# Patient Record
Sex: FEMALE | Race: WHITE | Employment: FULL TIME | URBAN - METROPOLITAN AREA
[De-identification: names, ages, dates, MRNs, and addresses within clinical notes are randomized per-mention and may not be internally consistent; named-entity substitution may affect disease eponyms.]

---

## 2018-01-03 ENCOUNTER — HOSPITAL ENCOUNTER (OUTPATIENT)
Dept: MAMMOGRAPHY | Age: 43
Discharge: HOME | End: 2018-01-03
Attending: INTERNAL MEDICINE
Payer: COMMERCIAL

## 2018-01-03 DIAGNOSIS — Z12.39 SCREENING BREAST EXAMINATION: ICD-10-CM

## 2018-01-03 PROCEDURE — 77067 SCR MAMMO BI INCL CAD: CPT

## 2019-01-25 ENCOUNTER — HOSPITAL ENCOUNTER (OUTPATIENT)
Dept: MAMMOGRAPHY | Age: 44
Discharge: HOME | End: 2019-01-25
Attending: INTERNAL MEDICINE
Payer: COMMERCIAL

## 2019-01-25 DIAGNOSIS — Z12.39 SCREENING BREAST EXAMINATION: ICD-10-CM

## 2019-01-25 PROCEDURE — 77067 SCR MAMMO BI INCL CAD: CPT

## 2020-02-10 ENCOUNTER — HOSPITAL ENCOUNTER (OUTPATIENT)
Dept: MAMMOGRAPHY | Age: 45
Discharge: HOME | End: 2020-02-10
Attending: INTERNAL MEDICINE
Payer: COMMERCIAL

## 2020-02-10 DIAGNOSIS — Z12.31 VISIT FOR SCREENING MAMMOGRAM: ICD-10-CM

## 2020-02-10 PROCEDURE — 77067 SCR MAMMO BI INCL CAD: CPT

## 2021-02-22 ENCOUNTER — TRANSCRIBE ORDER (OUTPATIENT)
Dept: SCHEDULING | Age: 46
End: 2021-02-22

## 2021-02-22 DIAGNOSIS — Z12.31 VISIT FOR SCREENING MAMMOGRAM: Primary | ICD-10-CM

## 2021-04-12 ENCOUNTER — HOSPITAL ENCOUNTER (OUTPATIENT)
Dept: MAMMOGRAPHY | Age: 46
Discharge: HOME | End: 2021-04-12
Attending: INTERNAL MEDICINE
Payer: COMMERCIAL

## 2021-04-12 DIAGNOSIS — Z12.31 VISIT FOR SCREENING MAMMOGRAM: ICD-10-CM

## 2021-04-12 PROCEDURE — 77063 BREAST TOMOSYNTHESIS BI: CPT

## 2022-04-13 ENCOUNTER — TRANSCRIBE ORDER (OUTPATIENT)
Dept: SCHEDULING | Age: 47
End: 2022-04-13

## 2022-04-13 DIAGNOSIS — Z12.31 VISIT FOR SCREENING MAMMOGRAM: Primary | ICD-10-CM

## 2022-04-15 ENCOUNTER — HOSPITAL ENCOUNTER (OUTPATIENT)
Dept: MAMMOGRAPHY | Age: 47
Discharge: HOME | End: 2022-04-15
Attending: INTERNAL MEDICINE
Payer: COMMERCIAL

## 2022-04-15 DIAGNOSIS — Z12.31 VISIT FOR SCREENING MAMMOGRAM: ICD-10-CM

## 2022-04-15 PROCEDURE — 77063 BREAST TOMOSYNTHESIS BI: CPT

## 2022-07-07 ENCOUNTER — HOSPITAL ENCOUNTER (OUTPATIENT)
Dept: PREADMISSION TESTING | Age: 47
Discharge: HOME | End: 2022-07-07
Payer: COMMERCIAL

## 2022-07-07 VITALS
HEIGHT: 67 IN | HEART RATE: 77 BPM | SYSTOLIC BLOOD PRESSURE: 113 MMHG | TEMPERATURE: 98.3 F | RESPIRATION RATE: 16 BRPM | BODY MASS INDEX: 23.01 KG/M2 | DIASTOLIC BLOOD PRESSURE: 78 MMHG | WEIGHT: 146.61 LBS

## 2022-07-07 LAB — HCG SERPL QL: NEGATIVE

## 2022-07-07 PROCEDURE — 84703 CHORIONIC GONADOTROPIN ASSAY: CPT

## 2022-07-07 RX ORDER — DIAZEPAM 10 MG/1
10 TABLET ORAL
COMMUNITY

## 2022-07-07 RX ORDER — POLYETHYLENE GLYCOL 3350 17 G/17G
17 POWDER, FOR SOLUTION ORAL DAILY
COMMUNITY

## 2022-07-07 RX ORDER — NORETHINDRONE ACETATE AND ETHINYL ESTRADIOL 1; .02 MG/1; MG/1
1 TABLET ORAL DAILY
COMMUNITY

## 2022-07-07 RX ORDER — DOCUSATE SODIUM 100 MG/1
100 CAPSULE, LIQUID FILLED ORAL DAILY
COMMUNITY

## 2022-07-07 RX ORDER — BISMUTH SUBSALICYLATE 262 MG
1 TABLET,CHEWABLE ORAL DAILY
COMMUNITY

## 2022-07-07 NOTE — PERIOP NOTES
[address] INSTRUCTIONS    Surgery Date:   8/4/22    Your surgeon's office or [facility] staff will call you between 4 PM- 8 PM the day before surgery with your arrival time. If your surgery is on a Monday, you will receive a call the preceding Friday. 1. Please report to [facility] Patient Access/Admitting on the 1st floor. Bring your insurance card, photo identification, and any copayment ( if applicable). 2. If you are going home the same day of your surgery, you must have a responsible adult to drive you home. You need to have a responsible adult to stay with you the first 24 hours after surgery and you should not drive a car for 24 hours following your surgery. 3. Do NOT eat any solid foods after midnight the night before surgery including candy, mint or gum. You may drink clear liquids from midnight until 1 hour prior to your arrival. You may drink up to 12 ounces at one time every 4 hours. Please note special instructions, if applicable, below for medications. 4. Do NOT drink alcohol or smoke 24 hours before surgery. STOP smoking for 14 days prior as it helps with breathing and healing after surgery. 5. If you are being admitted to the hospital, please leave personal belongings/luggage in your car until you have an assigned hospital room number. 6. Please wear comfortable clothes. Wear your glasses instead of contacts. We ask that all money, jewelry and valuables be left at home. Wear no make up, particularly mascara, the day of surgery. 7.  All body piercings, rings, and jewelry need to be removed and left at home. Please remove any nail polish or artifical nails from your fingernails. Please wear your hair loose or down. Please no pony-tails, buns, or any metal hair accessories. If you shower the morning of surgery, please do not apply any lotions or powders afterwards. You may wear deodorant, unless having breast surgery.   Do not shave any body area within 24 hours of your surgery. 8. Please follow all instructions to avoid any potential surgical cancellation. 9. Should your physical condition change, (i.e. fever, cold, flu, etc.) please notify your surgeon as soon as possible. 10. It is important to be on time. If a situation occurs where you may be delayed, please call:  [phone] / [phone] on the day of surgery. 11. The Preadmission Testing staff can be reached at [phone]. 12. Special instructions: BRING CRUTCHES FOR FITTING, BRING ADVANCED DIRECTIVE      Current Outpatient Medications   Medication Sig    multivitamin (ONE A DAY) tablet Take 1 Tablet by mouth daily.  polyethylene glycol (Miralax) 17 gram packet Take 17 g by mouth daily.  docusate sodium (Colace) 100 mg capsule Take 100 mg by mouth daily.  OTHER 1 Tablet daily. AIRBORN    inulin (FIBER GUMMIES PO) Take 2 Tablets by mouth daily.  diazePAM (VALIUM) 10 mg tablet Take 10 mg by mouth every eight (8) hours as needed for Anxiety (RECTAL SPASMS).  naproxen (NAPROSYN) 500 mg tablet Take 1 Tab by mouth every twelve (12) hours as needed for Pain.  levonorgestrel (MIRENA) 20 mcg/24 hr IUD 1 Each by IntraUTERine route once. Placed 2009     PSYLLIUM SEED, WITH SUGAR, (METAMUCIL PO) Take  by mouth. And fiber vitamins, approx 8 gm fiber/day      No current facility-administered medications for this encounter. 1. YOU MUST ONLY TAKE THESE MEDICATIONS THE MORNING OF SURGERY WITH A SIP OF WATER: NONE  2. MEDICATIONS TO TAKE THE MORNING OF SURGERY ONLY IF NEEDED: DIAZEPAM 4 HRS PRIOR TO ARRIVAL TIME  3. HOLD these prescription medications BEFORE Surgery: NONE  4. Ask your surgeon/prescribing physician about when/if to STOP taking these medications:BCP Stop all vitamins, herbal medicines and Aspirin containing products 7 days prior to surgery. Stop any non-steroidal anti-inflammatory drugs (i.e. Ibuprofen, Naproxen, Advil, Aleve) 3 days before surgery. You may take Tylenol. 5. If you are currently taking Plavix, Coumadin,or any other blood-thinning/anticoagulant medication contact your prescribing physician for instructions. Eating and Drinking Before Surgery     You may eat a regular dinner at the usual time on the day before your surgery.  Do NOT eat any solid foods after midnight unless your arrival time at the hospital is 3pm or later.  You may drink clear liquids only from 12 midnight until 1 hours prior to your arrival time at the hospital on the day of your surgery. Do NOT drink alcohol.  Clear liquids include:  o Water  o Fruit juices without pulp( i.e. apple juice)  o Carbonated beverages  o Black coffee (no cream/milk)  o Tea (no cream/milk)  o Gatorade   You may drink up to 12-16 ounces at one time every 4 hours between the hours of midnight and 1 hour before your arrival time at the hospital. Example- if your arrival time at the hospital is 6am, you may drink 12-16 ounces of clear liquids no later than 5am.   If your arrival time at the hospital is 3pm or later, you may eat a light breakfast before 8am.   A light breakfast includes:  o Toast or bagel (no butter)  o Black coffee (no cream/milk)  o Tea (no cream/milk)  o Fruit juices without pulp ( i.e. apple juice)  o Do NOT eat meat, eggs, vegetables or fruit   If you have any questions, please contact your surgeon's office. Preventing Infections Before and After - Your Surgery  1. Shower with ANTIBACTERIAL soap 2 times before your surgery   The evening before your surgery   The morning of your surgery      Tips to help prevent infections after your surgery:  1. Protect your surgical wound from germs:  ? Hand washing is the most important thing you and your caregivers can do to prevent infections. ? Keep your bandage clean and dry! ? Do not touch your surgical wound. 2. Use clean, freshly washed towels and washcloths every time you shower; do not share bath linens with others.   3. Until your surgical wound is healed, wear clothing and sleep on bed linens each day that are clean and freshly washed. 4. Do not allow pets to sleep in your bed with you or touch your surgical wound. 5. Do not smoke - smoking delays wound healing. This may be a good time to stop smoking. 6. If you have diabetes, it is important for you to manage your blood sugar levels properly before your surgery as well as after your surgery. Poorly managed blood sugar levels slow down wound healing and prevent you from healing completely. Patient Information Regarding COVID Restrictions      Day of Procedure     Please park in the parking deck or any designated visitor parking lot.  Enter the facility through the [facility] the [facility].   On the day of surgery, please provide the cell phone number of the person who will be waiting for you to the Patient Access representative at the time of registration.  Please wear a mask on the day of your procedure.  We are now allowing two designated visitors per stay. Pediatric patients may have 2 designated visitors. These two people may come in with you on the day of your procedure.  The designated visitor must also wear a mask.  Once your procedure and the immediate recovery period is completed, a nurse in the recovery area will contact your designated visitor to inform them of your room number or to otherwise review other pertinent information regarding your care.  Social distancing practices are to be adhered to in waiting areas and the cafeteria. The patient was contacted  in person. She verbalized understanding of all instructions does not  need reinforcement.

## 2022-08-03 ENCOUNTER — ANESTHESIA EVENT (OUTPATIENT)
Dept: MEDSURG UNIT | Age: 47
End: 2022-08-03
Payer: COMMERCIAL

## 2022-08-03 NOTE — H&P
History and Physical    Chief Complaint: Pain of the Right Knee    HISTORY OF PRESENT OF ILLNESS:  Maribel Venegas is a 41 y.o. female who returns today for follow-up of the right knee. Patient has a current ACL tear sustained when she fell down a hill. Patient has been attending physical therapy with good improvement. She reports one moment of instability when she was changing direction while walking; she describes this as a slipping shift in the knee. She has a colorectal surgery scheduled for 5/2022; her surgeon has cleared her for the ACL reconstruction should she desire to proceed. Patient is ambulating without assistance today. Past Medical History:   Diagnosis Date    Autoimmune disease    Graves disease    PONV (postoperative nausea and vomiting)     Past Surgical History:   Procedure Laterality Date    APPENDECTOMY    FOOT SURGERY Left   cyst removal    HAND SURGERY Left   middle finger joint replacement as child    KNEE SCOPE,REMV LOOSE BODY Right 09/09/2020   Procedure: ARTHROSCOPY, KNEE, SURGICAL; FOR REMOVAL OF LOOSE BODY OR FOREIGN BODY (EG, OSTEOCHONDRITIS DISSECANS FRAGMENTATION, CHONDRAL FRAGMENTATION); Surgeon: Anika Cortes MD; Location: [facility] [place]; Service: Orthopedics    KNEE 3 [facility] Right 09/09/2020   Procedure: ARTHROSCOPY, KNEE, SURGICAL; DEBRIDEMENT/SHAVING OF ARTICULAR CARTILAGE (CHONDROPLASTY); Surgeon: Anika Cortes MD; Location: [facility] [place];  Service: Orthopedics    KNEE SURGERY Left   ACL    NO RELEVANT SURGERIES    WISDOM TOOTH EXTRACTION     Family History   Problem Relation Age of Onset    Deep vein thrombosis Mother    Cancer Father    Coronary artery disease Father    No Known Problems Brother    No Known Problems Sister    No Known Problems Son    No Known Problems Daughter    Diabetes Neg Hx    Clotting disorder Neg Hx    Anesthesia problems Neg Hx     [unfilled]  Review of Systems   5/12/2022    Constitutional: Unexplained: Negative  Genitourinary: Frequent Urination: Negative  HEENT: Vision Loss: Negative  Neurological: Memory Loss: Negative  Integumentary: Rash: Negative  Cardiovascular: Palpatations: Negative  Hematologic: Bruises/Bleeds Easily: Negative  Gastrointestinal: Constipation: Negative  Immunological: Seasonal Allergies: Negative  Musculoskeletal: Joint Pain: Positive  Objective:     Vitals:   05/12/22 1342   Weight: 155 lb   Height: 5' 7\"     Constitutional: No acute distress. Well nourished. Well developed. Psychiatric: Alert and oriented x3. Eyes: Sclera are nonicteric. Respiratory: No labored breathing. Cardiovascular: No marked edema. Skin: No marked skin ulcers. Neurological: No marked sensory loss noted. Right knee exam has full range of motion. Less translation on Lachman with better end point. Normal quad strength. No effusion. The leg is neurovascular intact distally with no skin changes rashes erythema deformity or bruising about the leg. There is no edema and good pulses distally. Radiographs:         No imaging obtained     Assessment:     1. Rupture of anterior cruciate ligament of right knee, initial encounter     There is no problem list on file for this patient. Plan:     She set up for her colorectal surgery at the end of May with her Anterior Cruciate Ligament surgery set for the 4th of August. She wants use allograft and so will set her up for a quad tendon allograft. We will try to do her at the operatory but she probably will end up at [facility] because of the need for allograft. of a She is around 2 months out from ACL tear with good improvement in physical therapy. We are preparing for surgery at this point which she is scheduled for on 8/4/2022; she comes in today for a motion check and to discuss the operation further. She has had one moment of instability with daily activity so I am assured that surgery is the best next step as this it may contribute to wearing of the meniscus.  She had previous cadaver graft on the left side so would like to proceed with this on the right side. All of her questions were answered today. Follow-up after the operation. We discussed the risks of surgical treatment of Anterior Cruciate Ligament injury as well as the risks of nonsurgical treatment. We talked about the likelihood of arthritis over time in the knee with a deficient Anterior Cruciate Ligament. Talked about the inability to play cutting sports without an Anterior Cruciate Ligament. We discussed this with them the likelihood that with Anterior Cruciate Ligament surgery the patient would return to or near to their previous level of athletic activity. Talked about the procedure of the case including the arthroscopic assisted nature of the procedure and the minimal invasive technique. Talked about graft options and discussed with them hamstrings and patellar tendon. We discussed patellar tendon was the goal standard for young athletes. We discussed that hamstring was an excellent options in certain patients. We discussed the benefits of patellar tendon being the bony ingrowth of the graft and confidence that it would heal in 6 weeks. We discussed the down side being the anterior knee numbness and the difficulty with kneeling and potentially anterior knee pain. With hamstrings we discussed the affect on the acceleration and the chance that it would affect hamstring strength. We also talked about soft tissue healing to bone. We then went through the entirety of the rehab including what would happen with and without a meniscus repair. We talked about how they would, if it was just a Anterior Cruciate Ligament injury, be able to weightbear immediately and to wean out of the brace and crutches as soon as possible. We talked about the fact that meniscal injury would require more limited approach initially.  Talked about a goal of having them jog at around 3 months post surgically and return to cutting sports at 6 months postsurgically assuming they had their normal milestones in their quad strength returned. The pt expressed understanding of all this discussion. We answered all of the pt's questions. No orders of the defined types were placed in this encounter. Return in about 3 months (around 8/12/2022) for Follow Up. Medical documentation was entered into the chart by me, Steve Nails, medical scribe for Dr. Ted Fairchild.   5/12/2022     I, Giovanni Calabrese MD, personally, performed the services described in this documentation, as scribed in my presence, and it is both accurate and complete.   Scribed by: Steve Nails  Electronically signed by Giovanni Calabrese MD at 05/12/2022 8:10 PM EDT   Plan of Treatment  - documented as of this encounter    Plan of Treatment - Upcoming Encounters  Upcoming Encounters  Date Type Specialty Care Team Description   08/04/2022 Surgical Encounter Orthopaedic Surgery Lulu Murcia MD    [address] [address]    [place] [address]    [phone] (Work)    [phone] ([facility] [address])       [place], [address]    [place], [facility] [address]    [phone] (Work)    [phone] (Fax)       08/15/2022 Office Visit Orthopaedic Surgery Saúl Carlos, [address] [place] [address] [address]    [place] [address]    [phone] (Work)    [phone] (Fax)         Visit Diagnoses  - documented in this encounter    Visit Diagnoses  Diagnosis   Rupture of anterior cruciate ligament of right knee, initial encounter - Primary       Care Teams  - documented as of this encounter    Care Teams  Team Member Relationship Specialty Start Date End Date   Candace Sujit, [address] [facility], [address]    [phone] (Work)    [phone] (Fax)

## 2022-08-04 ENCOUNTER — HOSPITAL ENCOUNTER (OUTPATIENT)
Age: 47
Setting detail: OUTPATIENT SURGERY
Discharge: HOME | End: 2022-08-04
Attending: ORTHOPAEDIC SURGERY | Admitting: ORTHOPAEDIC SURGERY
Payer: COMMERCIAL

## 2022-08-04 ENCOUNTER — ANESTHESIA (OUTPATIENT)
Dept: MEDSURG UNIT | Age: 47
End: 2022-08-04
Payer: COMMERCIAL

## 2022-08-04 VITALS
DIASTOLIC BLOOD PRESSURE: 83 MMHG | HEART RATE: 74 BPM | RESPIRATION RATE: 12 BRPM | SYSTOLIC BLOOD PRESSURE: 122 MMHG | TEMPERATURE: 97.1 F | WEIGHT: 146.61 LBS | OXYGEN SATURATION: 97 % | BODY MASS INDEX: 22.96 KG/M2

## 2022-08-04 DIAGNOSIS — S83.511A NEW ACL TEAR, RIGHT, INITIAL ENCOUNTER: Primary | ICD-10-CM

## 2022-08-04 LAB — HCG UR QL: NEGATIVE

## 2022-08-04 PROCEDURE — 77030040922 HC BLNKT HYPOTHRM STRY -A

## 2022-08-04 PROCEDURE — 76210000035 HC AMBSU PH I REC 1 TO 1.5 HR: Performed by: ORTHOPAEDIC SURGERY

## 2022-08-04 PROCEDURE — 81025 URINE PREGNANCY TEST: CPT

## 2022-08-04 PROCEDURE — 77030008753 HC TU IRR IN/OUT FLO S&N -B: Performed by: ORTHOPAEDIC SURGERY

## 2022-08-04 PROCEDURE — 77030040361 HC SLV COMPR DVT MDII -B: Performed by: ORTHOPAEDIC SURGERY

## 2022-08-04 PROCEDURE — 77030010509 HC AIRWY LMA MSK TELE -A: Performed by: ANESTHESIOLOGY

## 2022-08-04 PROCEDURE — 74011250636 HC RX REV CODE- 250/636: Performed by: PHYSICIAN ASSISTANT

## 2022-08-04 PROCEDURE — 2709999900 HC NON-CHARGEABLE SUPPLY: Performed by: ORTHOPAEDIC SURGERY

## 2022-08-04 PROCEDURE — 74011250637 HC RX REV CODE- 250/637: Performed by: ANESTHESIOLOGY

## 2022-08-04 PROCEDURE — C1713 ANCHOR/SCREW BN/BN,TIS/BN: HCPCS | Performed by: ORTHOPAEDIC SURGERY

## 2022-08-04 PROCEDURE — 77030020269 HC MISC IMPL: Performed by: ORTHOPAEDIC SURGERY

## 2022-08-04 PROCEDURE — 76060000063 HC AMB SURG ANES 1.5 TO 2 HR: Performed by: ORTHOPAEDIC SURGERY

## 2022-08-04 PROCEDURE — 76030000020 HC AMB SURG 1.5 TO 2 HR INTENSV-TIER 1: Performed by: ORTHOPAEDIC SURGERY

## 2022-08-04 PROCEDURE — 77030020275 HC MISC ORTHOPEDIC: Performed by: ORTHOPAEDIC SURGERY

## 2022-08-04 PROCEDURE — 77030003601 HC NDL NRV BLK BBMI -A

## 2022-08-04 PROCEDURE — 74011250636 HC RX REV CODE- 250/636: Performed by: NURSE ANESTHETIST, CERTIFIED REGISTERED

## 2022-08-04 PROCEDURE — 74011250636 HC RX REV CODE- 250/636: Performed by: ANESTHESIOLOGY

## 2022-08-04 PROCEDURE — 77030002933 HC SUT MCRYL J&J -A: Performed by: ORTHOPAEDIC SURGERY

## 2022-08-04 PROCEDURE — 74011250637 HC RX REV CODE- 250/637: Performed by: ORTHOPAEDIC SURGERY

## 2022-08-04 PROCEDURE — 77030029200 HC SUT PASS WRE ARTH -B: Performed by: ORTHOPAEDIC SURGERY

## 2022-08-04 PROCEDURE — 77030020753 HC CUF TRNQT 1BLA STRY -B: Performed by: ORTHOPAEDIC SURGERY

## 2022-08-04 PROCEDURE — 77030018834: Performed by: ORTHOPAEDIC SURGERY

## 2022-08-04 PROCEDURE — 74011000250 HC RX REV CODE- 250: Performed by: NURSE ANESTHETIST, CERTIFIED REGISTERED

## 2022-08-04 PROCEDURE — 74011000250 HC RX REV CODE- 250: Performed by: PHYSICIAN ASSISTANT

## 2022-08-04 PROCEDURE — 77030031139 HC SUT VCRL2 J&J -A: Performed by: ORTHOPAEDIC SURGERY

## 2022-08-04 PROCEDURE — 77030006884 HC BLD SHV INCIS S&N -B: Performed by: ORTHOPAEDIC SURGERY

## 2022-08-04 RX ORDER — MIDAZOLAM HYDROCHLORIDE 1 MG/ML
1 INJECTION, SOLUTION INTRAMUSCULAR; INTRAVENOUS AS NEEDED
Status: DISCONTINUED | OUTPATIENT
Start: 2022-08-04 | End: 2022-08-04 | Stop reason: HOSPADM

## 2022-08-04 RX ORDER — MORPHINE SULFATE 2 MG/ML
2 INJECTION, SOLUTION INTRAMUSCULAR; INTRAVENOUS
Status: DISCONTINUED | OUTPATIENT
Start: 2022-08-04 | End: 2022-08-04 | Stop reason: HOSPADM

## 2022-08-04 RX ORDER — LIDOCAINE HYDROCHLORIDE 20 MG/ML
INJECTION, SOLUTION EPIDURAL; INFILTRATION; INTRACAUDAL; PERINEURAL AS NEEDED
Status: DISCONTINUED | OUTPATIENT
Start: 2022-08-04 | End: 2022-08-04 | Stop reason: HOSPADM

## 2022-08-04 RX ORDER — HYDROMORPHONE HYDROCHLORIDE 1 MG/ML
0.5 INJECTION, SOLUTION INTRAMUSCULAR; INTRAVENOUS; SUBCUTANEOUS
Status: DISCONTINUED | OUTPATIENT
Start: 2022-08-04 | End: 2022-08-04 | Stop reason: HOSPADM

## 2022-08-04 RX ORDER — SODIUM CHLORIDE, SODIUM LACTATE, POTASSIUM CHLORIDE, CALCIUM CHLORIDE 600; 310; 30; 20 MG/100ML; MG/100ML; MG/100ML; MG/100ML
100 INJECTION, SOLUTION INTRAVENOUS CONTINUOUS
Status: DISCONTINUED | OUTPATIENT
Start: 2022-08-04 | End: 2022-08-04 | Stop reason: HOSPADM

## 2022-08-04 RX ORDER — SODIUM CHLORIDE 0.9 % (FLUSH) 0.9 %
5-40 SYRINGE (ML) INJECTION AS NEEDED
Status: DISCONTINUED | OUTPATIENT
Start: 2022-08-04 | End: 2022-08-04 | Stop reason: HOSPADM

## 2022-08-04 RX ORDER — ROPIVACAINE HYDROCHLORIDE 5 MG/ML
30 INJECTION, SOLUTION EPIDURAL; INFILTRATION; PERINEURAL AS NEEDED
Status: COMPLETED | OUTPATIENT
Start: 2022-08-04 | End: 2022-08-04

## 2022-08-04 RX ORDER — FENTANYL CITRATE 50 UG/ML
INJECTION, SOLUTION INTRAMUSCULAR; INTRAVENOUS AS NEEDED
Status: DISCONTINUED | OUTPATIENT
Start: 2022-08-04 | End: 2022-08-04 | Stop reason: HOSPADM

## 2022-08-04 RX ORDER — MIDAZOLAM HYDROCHLORIDE 1 MG/ML
0.5 INJECTION, SOLUTION INTRAMUSCULAR; INTRAVENOUS
Status: DISCONTINUED | OUTPATIENT
Start: 2022-08-04 | End: 2022-08-04 | Stop reason: HOSPADM

## 2022-08-04 RX ORDER — SODIUM CHLORIDE, SODIUM LACTATE, POTASSIUM CHLORIDE, CALCIUM CHLORIDE 600; 310; 30; 20 MG/100ML; MG/100ML; MG/100ML; MG/100ML
INJECTION, SOLUTION INTRAVENOUS
Status: DISCONTINUED | OUTPATIENT
Start: 2022-08-04 | End: 2022-08-04 | Stop reason: HOSPADM

## 2022-08-04 RX ORDER — ACETAMINOPHEN 325 MG/1
650 TABLET ORAL ONCE
Status: DISCONTINUED | OUTPATIENT
Start: 2022-08-04 | End: 2022-08-04 | Stop reason: HOSPADM

## 2022-08-04 RX ORDER — LIDOCAINE HYDROCHLORIDE 10 MG/ML
0.1 INJECTION, SOLUTION EPIDURAL; INFILTRATION; INTRACAUDAL; PERINEURAL AS NEEDED
Status: DISCONTINUED | OUTPATIENT
Start: 2022-08-04 | End: 2022-08-04 | Stop reason: HOSPADM

## 2022-08-04 RX ORDER — SCOLOPAMINE TRANSDERMAL SYSTEM 1 MG/1
1 PATCH, EXTENDED RELEASE TRANSDERMAL
Status: DISCONTINUED | OUTPATIENT
Start: 2022-08-04 | End: 2022-08-04 | Stop reason: HOSPADM

## 2022-08-04 RX ORDER — DIPHENHYDRAMINE HYDROCHLORIDE 50 MG/ML
12.5 INJECTION, SOLUTION INTRAMUSCULAR; INTRAVENOUS AS NEEDED
Status: DISCONTINUED | OUTPATIENT
Start: 2022-08-04 | End: 2022-08-04 | Stop reason: HOSPADM

## 2022-08-04 RX ORDER — PROPOFOL 10 MG/ML
INJECTION, EMULSION INTRAVENOUS AS NEEDED
Status: DISCONTINUED | OUTPATIENT
Start: 2022-08-04 | End: 2022-08-04 | Stop reason: HOSPADM

## 2022-08-04 RX ORDER — FENTANYL CITRATE 50 UG/ML
25 INJECTION, SOLUTION INTRAMUSCULAR; INTRAVENOUS
Status: DISCONTINUED | OUTPATIENT
Start: 2022-08-04 | End: 2022-08-04 | Stop reason: HOSPADM

## 2022-08-04 RX ORDER — SODIUM CHLORIDE 0.9 % (FLUSH) 0.9 %
5-40 SYRINGE (ML) INJECTION EVERY 8 HOURS
Status: DISCONTINUED | OUTPATIENT
Start: 2022-08-04 | End: 2022-08-04 | Stop reason: HOSPADM

## 2022-08-04 RX ORDER — OXYCODONE AND ACETAMINOPHEN 5; 325 MG/1; MG/1
1 TABLET ORAL
Qty: 10 TABLET | Refills: 0 | Status: SHIPPED | OUTPATIENT
Start: 2022-08-04 | End: 2022-08-11

## 2022-08-04 RX ORDER — DEXAMETHASONE SODIUM PHOSPHATE 4 MG/ML
INJECTION, SOLUTION INTRA-ARTICULAR; INTRALESIONAL; INTRAMUSCULAR; INTRAVENOUS; SOFT TISSUE AS NEEDED
Status: DISCONTINUED | OUTPATIENT
Start: 2022-08-04 | End: 2022-08-04 | Stop reason: HOSPADM

## 2022-08-04 RX ORDER — KETAMINE HCL IN 0.9 % NACL 50 MG/5 ML
SYRINGE (ML) INTRAVENOUS AS NEEDED
Status: DISCONTINUED | OUTPATIENT
Start: 2022-08-04 | End: 2022-08-04 | Stop reason: HOSPADM

## 2022-08-04 RX ORDER — FENTANYL CITRATE 50 UG/ML
50 INJECTION, SOLUTION INTRAMUSCULAR; INTRAVENOUS AS NEEDED
Status: DISCONTINUED | OUTPATIENT
Start: 2022-08-04 | End: 2022-08-04 | Stop reason: HOSPADM

## 2022-08-04 RX ORDER — BUPROPION HYDROCHLORIDE 150 MG/1
150 TABLET ORAL DAILY
COMMUNITY

## 2022-08-04 RX ORDER — ONDANSETRON 4 MG/1
4 TABLET, ORALLY DISINTEGRATING ORAL
Status: DISCONTINUED | OUTPATIENT
Start: 2022-08-04 | End: 2022-08-04 | Stop reason: HOSPADM

## 2022-08-04 RX ORDER — ONDANSETRON 2 MG/ML
4 INJECTION INTRAMUSCULAR; INTRAVENOUS AS NEEDED
Status: DISCONTINUED | OUTPATIENT
Start: 2022-08-04 | End: 2022-08-04

## 2022-08-04 RX ORDER — ONDANSETRON 2 MG/ML
INJECTION INTRAMUSCULAR; INTRAVENOUS AS NEEDED
Status: DISCONTINUED | OUTPATIENT
Start: 2022-08-04 | End: 2022-08-04 | Stop reason: HOSPADM

## 2022-08-04 RX ORDER — SODIUM CHLORIDE 9 MG/ML
25 INJECTION, SOLUTION INTRAVENOUS CONTINUOUS
Status: DISCONTINUED | OUTPATIENT
Start: 2022-08-04 | End: 2022-08-04 | Stop reason: HOSPADM

## 2022-08-04 RX ADMIN — ONDANSETRON HYDROCHLORIDE 4 MG: 2 INJECTION, SOLUTION INTRAMUSCULAR; INTRAVENOUS at 09:32

## 2022-08-04 RX ADMIN — FENTANYL CITRATE 50 MCG: 50 INJECTION, SOLUTION INTRAMUSCULAR; INTRAVENOUS at 08:57

## 2022-08-04 RX ADMIN — FENTANYL CITRATE 25 MCG: 50 INJECTION, SOLUTION INTRAMUSCULAR; INTRAVENOUS at 10:09

## 2022-08-04 RX ADMIN — ROPIVACAINE HYDROCHLORIDE 30 ML: 5 INJECTION, SOLUTION EPIDURAL; INFILTRATION; PERINEURAL at 07:38

## 2022-08-04 RX ADMIN — SODIUM CHLORIDE, POTASSIUM CHLORIDE, SODIUM LACTATE AND CALCIUM CHLORIDE: 600; 310; 30; 20 INJECTION, SOLUTION INTRAVENOUS at 07:59

## 2022-08-04 RX ADMIN — WATER 2 G: 1 INJECTION INTRAMUSCULAR; INTRAVENOUS; SUBCUTANEOUS at 08:16

## 2022-08-04 RX ADMIN — FENTANYL CITRATE 50 MCG: 50 INJECTION, SOLUTION INTRAMUSCULAR; INTRAVENOUS at 09:34

## 2022-08-04 RX ADMIN — PROPOFOL 150 MG: 10 INJECTION, EMULSION INTRAVENOUS at 08:05

## 2022-08-04 RX ADMIN — LIDOCAINE HYDROCHLORIDE 100 MG: 20 INJECTION, SOLUTION EPIDURAL; INFILTRATION; INTRACAUDAL; PERINEURAL at 08:05

## 2022-08-04 RX ADMIN — PROPOFOL 50 MG: 10 INJECTION, EMULSION INTRAVENOUS at 08:45

## 2022-08-04 RX ADMIN — SODIUM CHLORIDE, POTASSIUM CHLORIDE, SODIUM LACTATE AND CALCIUM CHLORIDE 100 ML/HR: 600; 310; 30; 20 INJECTION, SOLUTION INTRAVENOUS at 07:36

## 2022-08-04 RX ADMIN — PROPOFOL 50 MG: 10 INJECTION, EMULSION INTRAVENOUS at 08:15

## 2022-08-04 RX ADMIN — DEXAMETHASONE SODIUM PHOSPHATE 8 MG: 4 INJECTION, SOLUTION INTRAMUSCULAR; INTRAVENOUS at 08:19

## 2022-08-04 RX ADMIN — Medication 20 MG: at 08:05

## 2022-08-04 RX ADMIN — FENTANYL CITRATE 50 MCG: 50 INJECTION, SOLUTION INTRAMUSCULAR; INTRAVENOUS at 07:30

## 2022-08-04 RX ADMIN — ONDANSETRON 4 MG: 4 TABLET, ORALLY DISINTEGRATING ORAL at 11:01

## 2022-08-04 RX ADMIN — MIDAZOLAM 2 MG: 1 INJECTION INTRAMUSCULAR; INTRAVENOUS at 07:30

## 2022-08-04 NOTE — ANESTHESIA PREPROCEDURE EVALUATION
Relevant Problems   No relevant active problems       Anesthetic History   No history of anesthetic complications            Review of Systems / Medical History  Patient summary reviewed, nursing notes reviewed and pertinent labs reviewed    Pulmonary  Within defined limits                 Neuro/Psych   Within defined limits      Psychiatric history     Cardiovascular  Within defined limits                Exercise tolerance: >4 METS     GI/Hepatic/Renal  Within defined limits   GERD           Endo/Other  Within defined limits    Hypothyroidism       Other Findings              Physical Exam    Airway  Mallampati: II  TM Distance: > 6 cm  Neck ROM: normal range of motion   Mouth opening: Normal     Cardiovascular  Regular rate and rhythm,  S1 and S2 normal,  no murmur, click, rub, or gallop             Dental  No notable dental hx       Pulmonary  Breath sounds clear to auscultation               Abdominal  GI exam deferred       Other Findings            Anesthetic Plan    ASA: 2  Anesthesia type: general      Post-op pain plan if not by surgeon: peripheral nerve block single    Induction: Intravenous  Anesthetic plan and risks discussed with: Patient

## 2022-08-04 NOTE — PROGRESS NOTES
08/04/22 0747   Vitals   Temp 98.3 °F (36.8 °C)   Temp Source Oral   Pulse (Heart Rate) 78   Heart Rate Source Monitor   Resp Rate 16   O2 Sat (%) 99 %   Level of Consciousness Alert (0)   /84   MAP (Calculated) 100   BP 1 Location Left upper arm   BP 1 Method Automatic   BP Patient Position At rest   MEWS Score 1   Oxygen Therapy   O2 Device Nasal cannula   O2 Flow Rate (L/min) 2 l/min     Vital signs post right AC block.

## 2022-08-04 NOTE — ROUTINE PROCESS
Patient: Brennon Edgar MRN: [id number]  SSN: [id number]   YOB: 1975  Age: 41 y.o. Sex: female     Patient is status post Procedure(s):  RIGHT KNEE ARTHROSCOPIC ANTERIOR CRUCIATE LIGAMENT RECONSTRUCTION WITH QUADRICEPS TENDON ALLOGRAFT (GEN/BLOCK). Surgeon(s) and Role:     Cory Guallpa MD (Jody) - Primary    Local/Dose/Irrigation:  SEE MAR                  Peripheral IV 08/04/22 Anterior; Left Forearm (Active)            Airway - Endotracheal Tube 08/04/22 (Active)                   Dressing/Packing:  Incision 08/04/22 Knee Right-Dressing/Treatment: ABD pad;Gauze dressing/dressing sponge; Ace wrap;Cast padding; Other (Comment) (tscope brace) (08/04/22 9481)    Splint/Cast:  ]    Other:

## 2022-08-04 NOTE — ANESTHESIA POSTPROCEDURE EVALUATION
Procedure(s):  RIGHT KNEE ARTHROSCOPIC ANTERIOR CRUCIATE LIGAMENT RECONSTRUCTION WITH QUADRICEPS TENDON ALLOGRAFT (GEN/BLOCK). general    Anesthesia Post Evaluation        Patient location during evaluation: PACU  Note status: Adequate. Level of consciousness: responsive to verbal stimuli and sleepy but conscious  Pain management: satisfactory to patient  Airway patency: patent  Anesthetic complications: no  Cardiovascular status: acceptable  Respiratory status: acceptable  Hydration status: acceptable  Comments: +Post-Anesthesia Evaluation and Assessment    Patient: Melisa Aranda MRN: [id number]  SSN: [id number]   YOB: 1975  Age: 41 y.o. Sex: female          Cardiovascular Function/Vital Signs    /83   Pulse 74   Temp 36.2 °C (97.1 °F)   Resp 12   Wt 66.5 kg (146 lb 9.7 oz)   SpO2 97%   BMI 22.96 kg/m²     Patient is status post Procedure(s):  RIGHT KNEE ARTHROSCOPIC ANTERIOR CRUCIATE LIGAMENT RECONSTRUCTION WITH QUADRICEPS TENDON ALLOGRAFT (GEN/BLOCK). Nausea/Vomiting: Controlled. Postoperative hydration reviewed and adequate. Pain:  Pain Scale 1: Numeric (0 - 10) (08/04/22 1014)  Pain Intensity 1: 2 (08/04/22 1014)   Managed. Neurological Status:   Neuro (WDL): Within Defined Limits (08/04/22 1015)   At baseline. Mental Status and Level of Consciousness: Arousable. Pulmonary Status:   O2 Device: None (Room air) (08/04/22 1015)   Adequate oxygenation and airway patent. Complications related to anesthesia: None    Post-anesthesia assessment completed. No concerns. I have evaluated the patient and the patient is stable and ready to be discharged from PACU . Signed By: Miguel Hartman MD    8/4/2022        INITIAL Post-op Vital signs:   Vitals Value Taken Time   /81 08/04/22 1045   Temp 36.2 °C (97.1 °F) 08/04/22 0953   Pulse 73 08/04/22 1047   Resp 19 08/04/22 1047   SpO2 99 % 08/04/22 1047   Vitals shown include unvalidated device data.

## 2022-08-04 NOTE — OP NOTES
Operative Report      Patient: Cookie Solorio MRN: [id number]  SSN: [id number]    YOB: 1975  Age: 41 y.o. Sex: female      Date of Surgery: 8/4/2022     Preoperative Diagnosis:    1. ACL Tear right Knee       Postoperative Diagnosis:  1. ACL Tear right Knee      2. Chondral lesion of medial femoral condyle  Procedures: 1. Arthroscopy right Knee and Arthroscopic Assisted ACL     Reconstruction With Quad Tendon Allograft    2. Medial Femoral Condyle Chondroplasty    Surgeon(s) and Role:     Cory Peter MD (Jody) - Primary     Assistant: Jerrell Fitzpatrick PA-C    Anesthesia: General    Estimated Blood Loss:  <20 ml      Specimens: * No specimens in log *     Complications: None     Findings:  1. anterior cruciate ligament tearright knee . 2. Medial Femoral Condyle chondral lesion    Hospital Problems  Date Reviewed: 8/3/2022   None    Indications: The patient is a 41 y.o. female with a known history of an anterior cruciate ligament tear in the right knee. Preoperative physical examination, radiographs, and magnetic resonance imaging demonstrated an anterior cruciate ligament tear in Her right knee. She is now electively admitted for anterior cruciate ligament reconstruction. Procedure in Detail: After the procedure was described to the patient, including the risks benefits and possible complications, the patient signed the informed consent. The patient was then taken to the operating suite. Following induction of general anesthesia, femoral nerve block and administration of antibiotic, the patient was positioned on the operating table in the supine fashion. The right knee was then examined under anesthesia. The patient was noted to have a positive Lachman and positive pivot shift. At this point, the right leg was then prepped and draped in sterile fashion. The right leg was then exsanguinated with an Esmarch bandage. The tourniquet was elevated to 300 mmHg.  At this point, a lateral portal was created and the joint was distended and fully inspected. The scope was introduced into the knee. Diagnostic arthroscopy commenced. Suprapatellar pouch and medial and lateral gutters were visualized. The undersurface of the patella and trochlea groove were well visualized. There was no chondromalacia of the patella and no chondromalacia of the trochlea. The scope was then advanced into the medial compartment. The patient's leg was flexed 30 degrees. The medial  compartment was visualized in its entirety and a medial portal was created from outside in using a spinal needle for localization. The medial compartment was thoroughly evaluated and the meniscus visualized. There medial meniscus was normal. The medial tibial plateau was normal. The medial femoral condyle was involved with a grade 3/4 lesion that we had previously addressed in a scope. The majority of the lesion was covered with fibrocartilage but there was a .5x. 25 cm lesion centrally that had a flap of cartilage that was removed. The scope was then advanced to the notch. The anterior cruciate ligament was torn. The posterior cruciate ligament was intact. The scope was then introduced to the lateral compartment. The patient's legs were put in the figure-of four position. Lateral meniscus was visualized in its entirety, both above and below the popliteal hiatus. The lateral meniscus was normal. - The articular surfaces were normal. The scope was then advanced back in the notch. The soft tissue was debrided in the notch using a shaver. The medial wall of the lateral femoral condyle was denuded of all soft tissue as was the tibial insertion of the ACL . The anterior cruciate ligament drill guide was inserted on the appropriate position on the tibial spine. The guidewire was then passed up through the anterior tibia and noted to be in excellent position.  An anterior tibialis graft had been prepared during the arthroscopic diagnostic portion of the procedure and had been kept on 15mmhg stretch on the back table. It's diameter was 10 mm with a length of 65mm. The tibial tunnel was then reamed with an 10mm flipcutter reamer. The tunnel was debrided of soft tissue with the shaver. An accessory inferomedial portal was made for placement of the femoral tunnel using a spinal needle for localization. The femoral all inside offset  guide was then used to insert the femoral pin down through the anterior cortex of the femur and out of the notch in the ACL footprint. The femoral tunnel was then drilled with a 10mm flip-cutter reamer at the 10 o'clock position to a depth of 30 mm. The posterior cortex was intact. The graft was passed through the retrobutton and the graft was passed. The Deep Roof was passed and flipped. The the graft was then advanced up into the tunnel using the advancement sutures and was very was stable. The graft was then cycled 20 times by flexing and extending the knee. The tibial fixation was achieved using an button also. The graft was tensioned at 30 degrees with a posterior drawer. The screw was placed with use of a nitenol wire. The knee was cycled again and then re-tensioned At this point, the knee was examined. There was no further evidence of Lachman. The scope was introduced back into the knee and the ligament probed and found to be taught. At this point, the knee was then copiously irrigated. Arthroscopic equipment was removed from the knee. The arthroscopic portals were approximated using 3-0 nylon horizontal mattress sutures. The anterior wound was closed with 0 Vicryl figure-of-eight sutures and 2-0 nylon sutures. A running prolene was used for the skin. The knee was injected with 30 cc of Naropin and 10 mg of dexamethasone. A sterile dressing was applied. The Cryocuff and knee immobilizer were applied. The tourniquet was released after total time of 57 minutes. The patient was then transferred to the recovery room in stable condition. Mejia Lynch PA-C was present for the entire case and her assistance was essential for the procedure including the graft preparation and the passage of the graft and fixation    Tourniquet Time:   Total Tourniquet Time Documented:  Thigh (Right) - 57 minutes  Total: Thigh (Right) - 57 minutes     Hardware Utilized:   Implant Name Type Inv. Item Serial No.  Lot No. LRB No. Used Action   ALLOGRAFT GRAFTLINK CONVENIENCE PACK   NA [address] Angulo [id number] Right 1 Implanted   FLEXGRAFT Jackie Lucks [id number]   [id number] [facility] Right 1 Implanted        Estimated Blood Loss: <20 cc    Specimens: None    Condition: Stable    Closure: Primary    Complications: None. Signed: Cory Packer MD (8/4/2022 at 8:15 AM)

## 2022-08-04 NOTE — DISCHARGE INSTRUCTIONS
POST-OPERATIVE INSTRUCTIONS  ACL RECONSTRUCTION  MD Lorelei Adam PA-C           Diet:    First at home should be clear liquids. Progress to regular diet as tolerated. Reconmmend increased fluid intake for several days. Ice :    Ice therapy should be used consistently for 48-72 hours after surgery. Subsequently, you should ice 3-4 times per day for 20 minutes at a timefor the next 10 daysto help with pain and swelling. Please ensure there is protective barrier between your skin and the ice. Dressings: You may remove the bulky dressing 48 hours after surgery( your therapist often will remove it for you at your first therapy visit ). Re-apply ace bandage to keep covered. Showering:    AFTER 48 HOURS IT IS OK TO REMOVE THE ACE BANDAGE AND SHOWER. .  You can get the incision a little wet in the shower , but do not submerge in a tub or pool . PLEASE LEAVE THE STERI-STRIPS IN PLACE UNTIL THEY FALL OFF          Elevation:   Elevate your surgical leg when sitting or sleeping to reduce swelling. Do not place a pillow directly under the knee - place it under your ankle. It is important to work on getting the knee out all the way straight . Medications:    A prescription for pain medication was sent to your pharmacy on record:   _________________oxycodone_________________________________________                * Please take 1 Aspirin 81MG twice a day for 14 days  beginning tomorrow to prevent a blood clot          *  All pain medication can cause constipation. Advise drinking plenty of fluids and taking an OTC stool softener such as Miralax. *  Be sure to start taking your pain medication before the block wears off           *  It is ok to supplement the pain medication with ibuprofen or Aleve           Physical therapy :   Will begin the day after surgery and will be scheduled ahead of time by our office. :_______tomorrow [facility]  ______________________________________________          Crutches : Will be provided for you if you do not already have them. You may fully weight bear with your brace and crutches/ Your therapist will progress you off of the crutches and out of the brace as your quad strength and extension improves. A knee immobilizer José Antonio Cyn   Will be applied to help with ambulation. You should continue to use the immobilizer and crutches until discontinued by your therapist. It will be locked in extension when you are weight bearing. Unlock or remove to work on your exercises. Post-op appointment :  Your post op appointment is scheduled for :_____sanam barragan 8/15 at 10:00am _____________ _________________ Please call the office if you need to re-schedule your appointment for another day or time ([phone])          Signs and symptoms to watch for include:     1. A sudden increase in swelling and l or redness or warmth at the area your surgery was performed which isn't relieved by rest, ice and elevation. 2 Oral temperature greater than 101 degrees for 12 hours or more which isn't relieved by an increase in fluid intake and Tylenol. 3 Excessive drainage from your incisions, or drainage which hasn't stopped by 72 hours after your surgery despite applying a compressive dressing, ice and elevation. 4 Calf pain, tenderness, redness or swelling which isn't relieved with rest and elevation. 5 Fever, chills, shortness ofbreath, chest pain, nausea, vomiting or other signs and symptoms which are of concern to you. After general anesthesia or intravenous sedation, for 24 hours or while taking prescription Narcotics:  Limit your activities  Do not drive and operate hazardous machinery  Do not make important personal or business decisions  Do  not drink alcoholic beverages  If you have not urinated within 8 hours after discharge, please contact your surgeon on call.         The discharge information has been reviewed with the patient and spouse. The patient and spouse verbalized understanding. Discharge medications reviewed with the patient and spouse and appropriate educational materials and side effects teaching were provided.   ___________________________________________________________________________________________________________________________________         ______________________________________________________________________

## 2023-03-23 ENCOUNTER — TRANSCRIBE ORDER (OUTPATIENT)
Dept: SCHEDULING | Age: 48
End: 2023-03-23

## 2023-03-23 DIAGNOSIS — Z12.31 VISIT FOR SCREENING MAMMOGRAM: Primary | ICD-10-CM

## 2023-04-23 DIAGNOSIS — Z12.31 VISIT FOR SCREENING MAMMOGRAM: Primary | ICD-10-CM

## 2023-05-20 RX ORDER — DIAZEPAM 10 MG/1
10 TABLET ORAL EVERY 8 HOURS PRN
COMMUNITY

## 2023-05-20 RX ORDER — PSEUDOEPHEDRINE HCL 30 MG
100 TABLET ORAL DAILY
COMMUNITY

## 2023-05-20 RX ORDER — NORETHINDRONE ACETATE AND ETHINYL ESTRADIOL 1; .02 MG/1; MG/1
1 TABLET ORAL DAILY
COMMUNITY

## 2023-05-20 RX ORDER — BUPROPION HYDROCHLORIDE 150 MG/1
150 TABLET ORAL DAILY
COMMUNITY

## 2023-05-20 RX ORDER — POLYETHYLENE GLYCOL 3350 17 G/17G
17 POWDER, FOR SOLUTION ORAL DAILY
COMMUNITY

## 2023-05-25 ENCOUNTER — HOSPITAL ENCOUNTER (OUTPATIENT)
Facility: HOSPITAL | Age: 48
Discharge: HOME | End: 2023-05-25
Payer: COMMERCIAL

## 2023-05-25 DIAGNOSIS — Z12.31 VISIT FOR SCREENING MAMMOGRAM: ICD-10-CM

## 2023-05-25 PROCEDURE — 77063 BREAST TOMOSYNTHESIS BI: CPT

## 2024-07-01 ENCOUNTER — TRANSCRIBE ORDERS (OUTPATIENT)
Facility: HOSPITAL | Age: 49
End: 2024-07-01

## 2024-07-01 DIAGNOSIS — Z12.31 VISIT FOR SCREENING MAMMOGRAM: Primary | ICD-10-CM

## 2024-07-05 ENCOUNTER — HOSPITAL ENCOUNTER (OUTPATIENT)
Facility: HOSPITAL | Age: 49
Discharge: HOME | End: 2024-07-05
Attending: INTERNAL MEDICINE
Payer: COMMERCIAL

## 2024-07-05 VITALS — HEIGHT: 67 IN | BODY MASS INDEX: 22.91 KG/M2 | WEIGHT: 146 LBS

## 2024-07-05 DIAGNOSIS — Z12.31 VISIT FOR SCREENING MAMMOGRAM: ICD-10-CM

## 2024-07-05 PROCEDURE — 77063 BREAST TOMOSYNTHESIS BI: CPT

## 2024-07-16 ENCOUNTER — HOSPITAL ENCOUNTER (OUTPATIENT)
Facility: HOSPITAL | Age: 49
Setting detail: INFUSION SERIES
Discharge: HOME | End: 2024-07-16
Payer: COMMERCIAL

## 2024-07-16 ENCOUNTER — INITIAL CONSULT (OUTPATIENT)
Age: 49
End: 2024-07-16

## 2024-07-16 VITALS
DIASTOLIC BLOOD PRESSURE: 67 MMHG | HEART RATE: 67 BPM | TEMPERATURE: 98.5 F | SYSTOLIC BLOOD PRESSURE: 121 MMHG | RESPIRATION RATE: 18 BRPM

## 2024-07-16 DIAGNOSIS — Z13.71 ENCOUNTER FOR NONPROCREATIVE GENETIC COUNSELING AND TESTING: Primary | ICD-10-CM

## 2024-07-16 DIAGNOSIS — Z71.83 ENCOUNTER FOR NONPROCREATIVE GENETIC COUNSELING AND TESTING: Primary | ICD-10-CM

## 2024-07-16 DIAGNOSIS — Z80.42 FAMILY HISTORY OF PROSTATE CANCER: ICD-10-CM

## 2024-07-16 PROCEDURE — 36415 COLL VENOUS BLD VENIPUNCTURE: CPT

## 2024-07-16 ASSESSMENT — PAIN SCALES - GENERAL: PAINLEVEL_OUTOF10: 0

## 2024-07-16 NOTE — PROGRESS NOTES
[facility] [place] [facility] [place]  A Part of [facility]  Genetic Counseling   [address]  [place] [address]  Phone: [phone]  Fax: [phone]    Date of Visit: 7/16/2024  Patient Name: Ashley Ponce  YOB: 1975  Referring Provider: None (self-referral)    HISTORY OF PRESENT ILLNESS  Ashley Ponce is a 49 y.o. female with no personal cancer history who presents today for genetic counseling due to a family history significant for metastatic prostate cancer in her father. We reviewed her medical and family history and discussed germline genetic testing options, including the implications of the different types of possible test results.  The appointment was conducted in person and Dr. Ponce was unaccompanied to the appointment.     Medical History  No personal cancer history.  Ovaries and uterus intact  History of benign cysts  Last mammogram June 2024 wnl, fibrocystic changes  Tyrer-Cuzick: 11.3% lifetime per last mammogram report  Last colonoscopy approximately 18 months ago wnl, 0 polyps, planned repeat interval 10 years. Lifetime cumulative polyps: ~4-5.  UTD on full body skin exam 6mo interval    Social History     Tobacco Use    Smoking status: Never    Smokeless tobacco: Never   Substance Use Topics    Alcohol use: Yes     Alcohol/week: 3.0 standard drinks of alcohol     Family History  A three-generation pedigree was collected at the time of the appointment based on patient report in the absence of complete medical records. A full pedigree is available in the electronic medical record for additional details.  No consanguinity reported in either maternal or paternal family.  No Ashkenazi Islam ancestry reported.     Children:  Son, 17, no cancer history, multifocal bilateral CHRPE, underwent negative sequencing of APC in 2010  Son, 15, no cancer history  Siblings:  Brother, 51, no cancer history, history of nasal mass in childhood  Maternal

## 2024-07-16 NOTE — PROGRESS NOTES
[facility] Lab visit:     1415: Patient arrived ambulatory and in no distress.  Labs drawn from [facility] for genetic lab kit. Departed [facility] ambulatory and in no distress.     Visit Vitals:  Patient Vitals for the past 12 hrs:   Temp Pulse Resp BP   07/16/24 1415 98.5 °F (36.9 °C) 67 18 121/67       Labs: See CC for pending results.

## 2024-08-02 ENCOUNTER — TELEPHONE (OUTPATIENT)
Age: 49
End: 2024-08-02

## 2024-08-22 NOTE — PROGRESS NOTES
[facility] [place] [facility] [place]  A Part of [facility]  Genetic Counseling   [address] [place] [address] [address]  [place] [address]  Phone: [phone]  Fax: [phone]    Date: 8/23/2024  Patient Name: Ashley Ponce  YOB: 1975  Referring Provider: None (self-referral)     INDICATION: Disclosure of germline genetic test results by phone.    INTERVAL HISTORY: Ashley Ponce is a 49 y.o. female who recently underwent germline genetic testing due to her family history which was significant for metastatic prostate cancer in her father. Additionally, her son was diagnosed with bilateral, multifocal CHRPE and had negative sequencing of APC in 2010. I spoke with Dr. Ponce by phone today to review the results of her genetic testing and their implications.    TEST RESULTS: NEGATIVE, No Clinically Significant Variants Detected  No pathogenic mutations, variants of unknown significance, or gross deletions or duplications were detected in the genes analyzed.   Testing performed: [facility] (ProstateNext panel with single gene add-on of APC).  Laboratory: [facility]  Genes Analyzed (15 total): APC, MORGAN, BRCA1, BRCA2, CHEK2, MLH1, MSH2, MSH6, NBN, PALB2, PMS2, RAD51D and TP53 (sequencing and deletion/duplication); HOXB13 (sequencing only); EPCAM (deletion/duplication only). RNA data is routinely analyzed for use in variant interpretation for all genes.  A full copy of results is available in the patient's record for additional details.     INTERPRETATION & DISCUSSION:  Genetic testing did not identify any genetic mutations associated with a hereditary cancer syndrome. This test cannot exclude the possibility of a mutation in this patient in a gene which was not included in her analysis. The patient may check in with our clinic and/or her other healthcare providers periodically to determine if there have been any updates to our knowledge on genes  Normal rate, regular rhythm.  Heart sounds S1, S2.  No murmurs, rubs or gallops.

## 2024-08-23 ENCOUNTER — SCHEDULED TELEPHONE ENCOUNTER (OUTPATIENT)
Age: 49
End: 2024-08-23

## 2024-08-23 DIAGNOSIS — Z71.83 ENCOUNTER FOR NONPROCREATIVE GENETIC COUNSELING: Primary | ICD-10-CM

## 2024-08-23 DIAGNOSIS — Z13.71 BRCA1 NEGATIVE: ICD-10-CM

## 2024-08-23 DIAGNOSIS — Z13.71 BRCA2 NEGATIVE: ICD-10-CM

## 2025-09-05 ENCOUNTER — TRANSCRIBE ORDERS (OUTPATIENT)
Facility: HOSPITAL | Age: 50
End: 2025-09-05

## 2025-09-05 DIAGNOSIS — Z12.31 VISIT FOR SCREENING MAMMOGRAM: Primary | ICD-10-CM

## (undated) DEVICE — DISPOSABLE TOURNIQUET CUFF SINGLE BLADDER, DUAL PORT AND QUICK CONNECT CONNECTOR: Brand: COLOR CUFF

## (undated) DEVICE — GOWN,PREVENTION PLUS,XLN/XL,ST,24/CS: Brand: MEDLINE

## (undated) DEVICE — SPONGE GZ W4XL4IN COT 12 PLY TYP VII WVN C FLD DSGN

## (undated) DEVICE — PADDING CAST W4INXL4YD SPUN DACRON POLY POR SYN VERSATILE

## (undated) DEVICE — ROCKER SWITCH PENCIL BLADE ELECTRODE, HOLSTER: Brand: EDGE

## (undated) DEVICE — Device

## (undated) DEVICE — DRAPE,REIN 53X77,STERILE: Brand: MEDLINE

## (undated) DEVICE — STRIP,CLOSURE,WOUND,MEDI-STRIP,1/2X4: Brand: MEDLINE

## (undated) DEVICE — BASIN ST MAJOR-NO CAUTERY: Brand: MEDLINE INDUSTRIES, INC.

## (undated) DEVICE — CUFF CRYOTHERAPY MED 18-13 IN KNEE CRYO/CUFF

## (undated) DEVICE — HANDLE LT SNAP ON ULT DURABLE LENS FOR TRUMPF ALC DISPOSABLE

## (undated) DEVICE — ARTHROSCOPY - RICHMOND: Brand: MEDLINE INDUSTRIES, INC.

## (undated) DEVICE — PASSER SUT WIRE STR DISP

## (undated) DEVICE — GLOVE SURG SZ 65 THK91MIL LTX FREE SYN POLYISOPRENE

## (undated) DEVICE — SOLUTION IRRIG 3000ML LAC RINGERS ARTHROMTC PLAS CONT

## (undated) DEVICE — GARMENT,MEDLINE,DVT,INT,CALF,MED, GEN2: Brand: MEDLINE

## (undated) DEVICE — DYONICS 25 INFLOW/OUTFLOW TUBE                                    SET, SINGLE SUCTION, 3 PER BOX

## (undated) DEVICE — SUTURE VCRL SZ 2-0 L27IN ABSRB VLT L26MM SH 1/2 CIR J317H

## (undated) DEVICE — SUTURE MCRYL SZ 3-0 L18IN ABSRB UD L19MM PS-2 3/8 CIR PRIM Y497G

## (undated) DEVICE — 4-PORT MANIFOLD: Brand: NEPTUNE 2

## (undated) DEVICE — SUTURE VCRL SZ 0 L27IN ABSRB UD L26MM CT-2 1/2 CIR J270H

## (undated) DEVICE — PREP SKN CHLRAPRP APL 26ML STR --

## (undated) DEVICE — TOWEL SURG W17XL27IN STD BLU COT NONFENESTRATED PREWASHED

## (undated) DEVICE — 4.5 MM INCISOR PLUS STRAIGHT                                    BLADES, POWER/EP-1, VIOLET, PACKAGED                                    6 PER BOX, STERILE

## (undated) DEVICE — GLOVE ORTHO 8   MSG9480

## (undated) DEVICE — REM POLYHESIVE ADULT PATIENT RETURN ELECTRODE: Brand: VALLEYLAB